# Patient Record
Sex: MALE | Race: WHITE | NOT HISPANIC OR LATINO | Employment: FULL TIME | URBAN - METROPOLITAN AREA
[De-identification: names, ages, dates, MRNs, and addresses within clinical notes are randomized per-mention and may not be internally consistent; named-entity substitution may affect disease eponyms.]

---

## 2017-01-11 ENCOUNTER — OFFICE VISIT (OUTPATIENT)
Dept: FAMILY MEDICINE | Facility: CLINIC | Age: 27
End: 2017-01-11
Payer: COMMERCIAL

## 2017-01-11 VITALS
SYSTOLIC BLOOD PRESSURE: 106 MMHG | WEIGHT: 216 LBS | TEMPERATURE: 98.4 F | DIASTOLIC BLOOD PRESSURE: 60 MMHG | HEIGHT: 71 IN | RESPIRATION RATE: 16 BRPM | HEART RATE: 83 BPM | OXYGEN SATURATION: 99 % | BODY MASS INDEX: 30.24 KG/M2

## 2017-01-11 DIAGNOSIS — J06.9 UPPER RESPIRATORY TRACT INFECTION, UNSPECIFIED TYPE: Primary | ICD-10-CM

## 2017-01-11 PROCEDURE — 99213 OFFICE O/P EST LOW 20 MIN: CPT | Performed by: PHYSICIAN ASSISTANT

## 2017-01-11 NOTE — MR AVS SNAPSHOT
After Visit Summary   1/11/2017    Tremaine Castro    MRN: [id number]           Patient Information     Date Of Birth          1990        Visit Information        Provider Department      1/11/2017 1:20 PM Max Langford PA [facility]        Today's Diagnoses     Upper respiratory tract infection, unspecified type    -  1       Care Instructions    Trial over the counter symptomatic relief, rest, and drink plenty of fluids. Salt water gargles and nasal lavage may also be helpful.  Return to clinic or Emergency Department for breathing/swallowing problems, fever >105, altered mental status, or worsening general condition.      Viral Respiratory Illness:  You have an Upper Respiratory Illness (URI) caused by a virus. This illness is contagious during the first few days. It is spread through the air by coughing and sneezing or by direct contact (touching the sick person and then touching your own eyes, nose or mouth). Most viral illnesses go away within 7-10 days with rest and simple home remedies. Sometimes, the illness may last for several weeks. Antibiotics will not kill a virus and are generally not prescribed for this condition.  Home Care:  1) If symptoms are severe, rest at home for the first 2-3 days. When you resume activity, don't let yourself get too tired.  2) Avoid being exposed to cigarette smoke (yours or others ).  3) Tylenol (acetaminophen) or ibuprofen (Advil, Motrin) will help fever, muscle aching and headache. (Persons under 18 with fever should not take aspirin since this may cause liver damage.)  4) Your appetite may be poor, so a light diet is fine. Avoid dehydration by drinking 6-8 glasses of fluids per day (water, soft, drinks, juices, tea, soup, etc.). Extra fluids will help loosen secretions in the nose and lungs.  5) Over-the-counter cold medicines will not shorten the length of time you re sick, but they may be helpful for the  "following symptoms: cough (Robitussin DM); sore throat (Chloraseptic lozenges or spray); nasal and sinus congestion (Actifed, Sudafed, Chlortrimeton).  Follow Up  with your doctor or as advised if you don t improve over the next week.  Get Prompt Medical Attention  if any of the following occur:  -- Cough with lots of colored sputum (mucus) or blood in your sputum  -- Chest pain, shortness of breath, wheezing or have trouble breathing  -- Severe headache; face, neck or ear pain  -- Fever over 100.4  F (38.0  C) for more than three days  -- You can t swallow due to throat pain    1929-8997 [facility], [address] [place] [address], [place] [address]. All rights reserved. This information is not intended as a substitute for professional medical care. Always follow your healthcare professional's instructions.              Follow-ups after your visit        Follow-up notes from your care team     Return if symptoms worsen or fail to improve.      Who to contact     If you have questions or need follow up information about today's clinic visit or your schedule please contact [facility] directly at [phone].  Normal or non-critical lab and imaging results will be communicated to you by MyChart, letter or phone within 4 business days after the clinic has received the results. If you do not hear from us within 7 days, please contact the clinic through [employer] or phone. If you have a critical or abnormal lab result, we will notify you by phone as soon as possible.  Submit refill requests through XING or call your pharmacy and they will forward the refill request to us. Please allow 3 business days for your refill to be completed.          Additional Information About Your Visit        [employer] Information     XING lets you send messages to your doctor, view your test results, renew your prescriptions, schedule appointments and more. To sign up, go to [email or web address] . Click on \"Log in\" on " "the left side of the screen, which will take you to the Welcome page. Then click on \"Sign up Now\" on the right side of the page.     You will be asked to enter the access code listed below, as well as some personal information. Please follow the directions to create your username and password.     Your access code is: [email or web address]  Expires: 2017  1:52 PM     Your access code will  in 90 days. If you need help or a new code, please call your [facility] or [phone].        Care EveryWhere ID     This is your Care EveryWhere ID. This could be used by other organizations to access your [place] medical records  [id number]        Your Vitals Were     Pulse Temperature Respirations Height BMI (Body Mass Index) Pulse Oximetry    83 98.4  F (36.9  C) (Oral) 16 5' 11\" (1.803 m) 30.14 kg/m2 99%       Blood Pressure from Last 3 Encounters:   17 106/60   16 130/87   16 130/70    Weight from Last 3 Encounters:   17 216 lb (97.977 kg)   16 213 lb (96.616 kg)   16 216 lb 9.6 oz (98.249 kg)              Today, you had the following     No orders found for display       Primary Care Provider Office Phone # Fax #    Brian Echevarria [phone]       [facility] [address] [facility] [address]        Thank you!     Thank you for choosing [facility]  for your care. Our goal is always to provide you with excellent care. Hearing back from our patients is one way we can continue to improve our services. Please take a few minutes to complete the written survey that you may receive in the mail after your visit with us. Thank you!             Your Updated Medication List - Protect others around you: Learn how to safely use, store and throw away your medicines at www.disposemymeds.org.      Notice  As of 2017  1:52 PM    You have not been prescribed any medications.      "

## 2017-01-11 NOTE — PROGRESS NOTES
"  SUBJECTIVE:                                                    Tremaine Castro is a 26 year old male who presents to clinic today for the following health issues:      Acute Illness   Acute illness concerns: sinus  Onset: 4-5 days ago with ST, today with other sympotms     Fever: no    Chills/Sweats: no    Headache (location?): YES    Sinus Pressure:YES    Conjunctivitis:  no    Ear Pain: YES: right    Rhinorrhea: YES    Congestion: YES    Sore Throat: YES     Cough: YES-non-productive    Wheeze: no    Decreased Appetite: YES    Nausea: no    Vomiting: no    Diarrhea:  no    Dysuria/Freq.: no    Fatigue/Achiness: YES    Sick/Strep Exposure: YES- work      Therapies Tried and outcome: nothing             No Known Allergies    Past Medical History   Diagnosis Date     ADD (attention deficit disorder)      Back pain      L-4 L-5 Prolase per patient          No current outpatient prescriptions on file prior to visit.  No current facility-administered medications on file prior to visit.    Social History   Substance Use Topics     Smoking status: Never Smoker      Smokeless tobacco: Current User     Types: Chew     Alcohol Use: Yes      Comment: social       ROS:  Consitutional: As above  ENT: As above  Respiratory: As above    OBJECTIVE:  /60 mmHg  Pulse 83  Temp(Src) 98.4  F (36.9  C) (Oral)  Resp 16  Ht 5' 11\" (1.803 m)  Wt 216 lb (97.977 kg)  BMI 30.14 kg/m2  SpO2 99%  GENERAL APPEARANCE: healthy, alert and no distress  EYES: conjunctiva clear  HENT:  TMs w/o erythema, effusion or bulging.  Nose and mouth without ulcers, erythema or lesions.  NO tonsillar enlargement erythema or exudates.   NECK: supple, nontender, no lymphadenopathy  RESP: lungs clear to auscultation - no rales, rhonchi or wheezes  CV: regular rates and rhythm, normal S1 S2, no murmur noted  NEURO: awake, alert          ASSESSMENT: Well appearing.    ICD-10-CM    1. Upper respiratory tract infection, unspecified type J06.9  "         PLAN:  Lots of rest and fluids.  RTC if any worsening symptoms or if not improving.    Nicolás Langford PA-C

## 2017-01-11 NOTE — PATIENT INSTRUCTIONS
Trial over the counter symptomatic relief, rest, and drink plenty of fluids. Salt water gargles and nasal lavage may also be helpful.  Return to clinic or Emergency Department for breathing/swallowing problems, fever >105, altered mental status, or worsening general condition.      Viral Respiratory Illness:  You have an Upper Respiratory Illness (URI) caused by a virus. This illness is contagious during the first few days. It is spread through the air by coughing and sneezing or by direct contact (touching the sick person and then touching your own eyes, nose or mouth). Most viral illnesses go away within 7-10 days with rest and simple home remedies. Sometimes, the illness may last for several weeks. Antibiotics will not kill a virus and are generally not prescribed for this condition.  Home Care:  1) If symptoms are severe, rest at home for the first 2-3 days. When you resume activity, don't let yourself get too tired.  2) Avoid being exposed to cigarette smoke (yours or others ).  3) Tylenol (acetaminophen) or ibuprofen (Advil, Motrin) will help fever, muscle aching and headache. (Persons under 18 with fever should not take aspirin since this may cause liver damage.)  4) Your appetite may be poor, so a light diet is fine. Avoid dehydration by drinking 6-8 glasses of fluids per day (water, soft, drinks, juices, tea, soup, etc.). Extra fluids will help loosen secretions in the nose and lungs.  5) Over-the-counter cold medicines will not shorten the length of time you re sick, but they may be helpful for the following symptoms: cough (Robitussin DM); sore throat (Chloraseptic lozenges or spray); nasal and sinus congestion (Actifed, Sudafed, Chlortrimeton).  Follow Up  with your doctor or as advised if you don t improve over the next week.  Get Prompt Medical Attention  if any of the following occur:  -- Cough with lots of colored sputum (mucus) or blood in your sputum  -- Chest pain, shortness of breath, wheezing or  have trouble breathing  -- Severe headache; face, neck or ear pain  -- Fever over 100.4  F (38.0  C) for more than three days  -- You can t swallow due to throat pain    [id number] [employer], [address] [place] [address], [place] [address]. All rights reserved. This information is not intended as a substitute for professional medical care. Always follow your healthcare professional's instructions.

## 2017-01-11 NOTE — Clinical Note
[address] [address]  Phone: [phone]    January 11, 2017        Tremaine Ely Matthew  [facility] [address]          To whom it may concern:    RE: Tremaine Farmeruriel    Patient was seen and treated today at our clinic.  Patient excused from work today.    Patient may return to work without restrictions after that.          Please contact me for questions or concerns.      Sincerely,        RADHA Aguirre

## 2017-01-11 NOTE — NURSING NOTE
"Chief Complaint   Patient presents with     URI       Initial /60 mmHg  Pulse 83  Temp(Src) 98.4  F (36.9  C) (Oral)  Ht 5' 11\" (1.803 m)  Wt 216 lb (97.977 kg)  BMI 30.14 kg/m2  SpO2 99% Estimated body mass index is 30.14 kg/(m^2) as calculated from the following:    Height as of this encounter: 5' 11\" (1.803 m).    Weight as of this encounter: 216 lb (97.977 kg).  BP completed using cuff size: juan francisco Rhodes CMA      "

## 2017-04-13 ENCOUNTER — OFFICE VISIT (OUTPATIENT)
Dept: FAMILY MEDICINE | Facility: CLINIC | Age: 27
End: 2017-04-13
Payer: COMMERCIAL

## 2017-04-13 VITALS
DIASTOLIC BLOOD PRESSURE: 80 MMHG | SYSTOLIC BLOOD PRESSURE: 120 MMHG | WEIGHT: 218 LBS | BODY MASS INDEX: 30.4 KG/M2 | HEART RATE: 94 BPM | TEMPERATURE: 98.5 F

## 2017-04-13 DIAGNOSIS — R59.9 ENLARGED LYMPH NODES: Primary | ICD-10-CM

## 2017-04-13 PROCEDURE — 99212 OFFICE O/P EST SF 10 MIN: CPT | Performed by: FAMILY MEDICINE

## 2017-04-13 NOTE — PROGRESS NOTES
SUBJECTIVE:  26 year old.The patient has a history of mass.  This started 5 dyas ago. Location left lumbar are quality smooth Associated symptoms are painful when palated .  Brought on by unknown . ROS no fever, chills, weight stable  Father colon cancer and patient has had colonscopy within the last 5 years.    Reviewed health maintenance  Patient Active Problem List   Diagnosis     Family history of colon cancer     CARDIOVASCULAR SCREENING; LDL GOAL LESS THAN 160     Low back pain     Sciatica     Intervertebral disk syndrome     Past Medical History:   Diagnosis Date     ADD (attention deficit disorder)      Back pain     L-4 L-5 Prolase per patient        OBJECTIVE:  no apparent distress  /80  Pulse 94  Temp 98.5  F (36.9  C) (Oral)  Wt 218 lb (98.9 kg)  BMI 30.4 kg/m2  left subcutaneous mass smooth firm 1 cm in diameter       ICD-10-CM    1. Enlarged lymph nodes R59.9     PLAN: Recheck one month

## 2017-04-13 NOTE — MR AVS SNAPSHOT
"              After Visit Summary   2017    Tremaine Castro    MRN: [id number]           Patient Information     Date Of Birth          1990        Visit Information        Provider Department      2017 4:00 PM Brian Echevarria MD [facility]        Today's Diagnoses     Enlarged lymph nodes    -  1       Follow-ups after your visit        Who to contact     If you have questions or need follow up information about today's clinic visit or your schedule please contact [facility] directly at [phone].  Normal or non-critical lab and imaging results will be communicated to you by MyChart, letter or phone within 4 business days after the clinic has received the results. If you do not hear from us within 7 days, please contact the clinic through [employer] or phone. If you have a critical or abnormal lab result, we will notify you by phone as soon as possible.  Submit refill requests through Kitchfix or call your pharmacy and they will forward the refill request to us. Please allow 3 business days for your refill to be completed.          Additional Information About Your Visit        MyChart Information     Kitchfix lets you send messages to your doctor, view your test results, renew your prescriptions, schedule appointments and more. To sign up, go to [email or web address] . Click on \"Log in\" on the left side of the screen, which will take you to the Welcome page. Then click on \"Sign up Now\" on the right side of the page.     You will be asked to enter the access code listed below, as well as some personal information. Please follow the directions to create your username and password.     Your access code is: [email or web address]  Expires: 2017  4:18 PM     Your access code will  in 90 days. If you need help or a new code, please call your [facility] or [phone].        Care EveryWhere ID     This is your Care EveryWhere ID. This could be used by other " organizations to access your [facility] medical records  [id number]        Your Vitals Were     Pulse Temperature BMI (Body Mass Index)             94 98.5  F (36.9  C) (Oral) 30.4 kg/m2          Blood Pressure from Last 3 Encounters:   04/13/17 120/80   01/11/17 106/60   06/19/16 130/87    Weight from Last 3 Encounters:   04/13/17 218 lb (98.9 kg)   01/11/17 216 lb (98 kg)   06/19/16 213 lb (96.6 kg)              Today, you had the following     No orders found for display       Primary Care Provider Office Phone # Fax #    Brian Echevarria [phone]       [facility] [address] [facility] [address]        Thank you!     Thank you for choosing [facility]  for your care. Our goal is always to provide you with excellent care. Hearing back from our patients is one way we can continue to improve our services. Please take a few minutes to complete the written survey that you may receive in the mail after your visit with us. Thank you!             Your Updated Medication List - Protect others around you: Learn how to safely use, store and throw away your medicines at www.disposemymeds.org.      Notice  As of 4/13/2017  4:18 PM    You have not been prescribed any medications.

## 2017-04-13 NOTE — NURSING NOTE
"Chief Complaint   Patient presents with     Mass     lump in lower back, history of L-4, L-5 disc protrusion        Initial /85  Pulse 94  Temp 98.5  F (36.9  C) (Oral)  Wt 218 lb (98.9 kg)  BMI 30.4 kg/m2 Estimated body mass index is 30.4 kg/(m^2) as calculated from the following:    Height as of 1/11/17: 5' 11\" (1.803 m).    Weight as of this encounter: 218 lb (98.9 kg).  Medication Reconciliation: complete  Tyron Isaacs CMA    "

## 2017-04-21 ENCOUNTER — TELEPHONE (OUTPATIENT)
Dept: FAMILY MEDICINE | Facility: CLINIC | Age: 27
End: 2017-04-21

## 2017-04-21 DIAGNOSIS — M79.9 SOFT TISSUE LESION: Primary | ICD-10-CM

## 2017-04-21 NOTE — TELEPHONE ENCOUNTER
Recently seen for small lump in back.  Tremaine wants name and number to specialist that was recommended (he cant remember what type of specialist it is?)

## 2017-04-21 NOTE — TELEPHONE ENCOUNTER
RN doesn't see in office notes that patient was referred anywhere.   Routing to PCP to advise on referral.      Paula Nelson, RN

## 2017-05-02 ENCOUNTER — OFFICE VISIT (OUTPATIENT)
Dept: SURGERY | Facility: CLINIC | Age: 27
End: 2017-05-02
Payer: COMMERCIAL

## 2017-05-02 VITALS
HEIGHT: 71 IN | HEART RATE: 74 BPM | BODY MASS INDEX: 30.25 KG/M2 | WEIGHT: 216.1 LBS | SYSTOLIC BLOOD PRESSURE: 139 MMHG | OXYGEN SATURATION: 98 % | DIASTOLIC BLOOD PRESSURE: 86 MMHG

## 2017-05-02 DIAGNOSIS — D23.5 BENIGN NEOPLASM OF SKIN OF TRUNK, EXCEPT SCROTUM: Primary | ICD-10-CM

## 2017-05-02 PROCEDURE — 99203 OFFICE O/P NEW LOW 30 MIN: CPT | Performed by: SURGERY

## 2017-05-02 ASSESSMENT — PAIN SCALES - GENERAL: PAINLEVEL: NO PAIN (1)

## 2017-05-02 NOTE — NURSING NOTE
"Chief Complaint   Patient presents with     Consult     Soft tissue lesion [M79.9] Pt reports on the lower off to the left side.        Initial /86 (BP Location: Right arm, Patient Position: Chair, Cuff Size: Adult Regular)  Pulse 74  Ht 5' 11\" (1.803 m)  Wt 216 lb 1.6 oz (98 kg)  SpO2 98%  BMI 30.14 kg/m2 Estimated body mass index is 30.14 kg/(m^2) as calculated from the following:    Height as of this encounter: 5' 11\" (1.803 m).    Weight as of this encounter: 216 lb 1.6 oz (98 kg)..  BP completed using cuff size: regular    Wilda Robison CMA    "

## 2017-05-02 NOTE — MR AVS SNAPSHOT
After Visit Summary   5/2/2017    Tremaine Castro    MRN: [id number]           Patient Information     Date Of Birth          1990        Visit Information        Provider Department      5/2/2017 10:00 AM Andrés Mccoy,  [facility]        Today's Diagnoses     Benign neoplasm of skin of trunk, except scrotum    -  1      Care Instructions    Scheduling Information:  To schedule your CT/MRI scan, please contact [facility] at [phone] or [facility] at [phone].    To schedule your surgery, please contact our Specialty Schedulers at [phone].    ** If a CT scan or biopsy were ordered/done, the Surgeon may need to see you back in clinic to go over the results and discuss treatment optins based on your results.       General Surgery - [place] Location.    If you have any questions regarding to your visit please contact your care team:     [place]annamarie Chavira [address] [place], [place] [address], [place] [address]   [address] Dr..  Lissy Chavira.  Lissy Chavira.   Nettie Blackwood, [facility]  Logan Rahman [facility]  Kristi Robison [facility]   Appointment line: [phone]  [facility] Desk: [phone]  Specialty Scheduling (for surgeries only) [phone]     If you need a medication refill please contact your pharmacy.  Please allow 3 business days for your refill to be completed.    As always, Thank you for trusting us with your health care needs!  _____________________________________________________________________              Follow-ups after your visit        Who to contact     If you have questions or need follow up information about today's clinic visit or your schedule please [facility] at [phone].  Normal or non-critical lab and imaging results will be communicated to you by MyChart, letter or phone within 4 business days after the clinic has received the results. If  "you do not hear from us within 7 days, please contact the clinic through [employer] or phone. If you have a critical or abnormal lab result, we will notify you by phone as soon as possible.  Submit refill requests through [employer] or call your pharmacy and they will forward the refill request to us. Please allow 3 business days for your refill to be completed.          Additional Information About Your Visit        [employer] Information     [employer]ed lets you send messages to your doctor, view your test results, renew your prescriptions, schedule appointments and more. To sign up, go to [email or web address] . Click on \"Log in\" on the left side of the screen, which will take you to the Welcome page. Then click on \"Sign up Now\" on the right side of the page.     You will be asked to enter the access code listed below, as well as some personal information. Please follow the directions to create your username and password.     Your access code is: [email or web address]  Expires: 2017  4:18 PM     Your access code will  in 90 days. If you need help or a new code, please call your [facility] clinic or [phone].        Care EveryWhere ID     This is your Care EveryWhere ID. This could be used by other organizations to access your [place] medical records  [id number]        Your Vitals Were     Pulse Height Pulse Oximetry BMI (Body Mass Index)          74 1.803 m (5' 11\") 98% 30.14 kg/m2         Blood Pressure from Last 3 Encounters:   17 139/86   17 120/80   17 106/60    Weight from Last 3 Encounters:   17 98 kg (216 lb 1.6 oz)   17 98.9 kg (218 lb)   17 98 kg (216 lb)               Primary Care Provider Office Phone # Fax #    Brian Echevarria [phone]       [facility] [address] [facility] [address]        Thank you!     Thank you for choosing [facility]  for your care. Our goal is always to provide you with excellent care. " Hearing back from our patients is one way we can continue to improve our services. Please take a few minutes to complete the written survey that you may receive in the mail after your visit with us. Thank you!             Your Updated Medication List - Protect others around you: Learn how to safely use, store and throw away your medicines at www.disposemymeds.org.      Notice  As of 5/2/2017 11:59 PM    You have not been prescribed any medications.

## 2017-05-02 NOTE — PATIENT INSTRUCTIONS
Scheduling Information:  To schedule your CT/MRI scan, please contact [facility] at [phone] or [facility] at [phone].    To schedule your surgery, please contact our Specialty Schedulers at [phone].    ** If a CT scan or biopsy were ordered/done, the Surgeon may need to see you back in clinic to go over the results and discuss treatment optins based on your results.       General Surgery - [place] Location.    If you have any questions regarding to your visit please contact your care team:     Edwige Chavira [address] [place] [address]  [place] [address]   [address] Dr.over.  Lissy Chavira.  Lissy Chavira.   Nettie Blackwood, [facility]  Logan Rahman [facility]  Kristi Robison [facility]   Appointment line: [phone]  [facility] Desk: [phone]  Specialty Scheduling (for surgeries only) [phone]     If you need a medication refill please contact your pharmacy.  Please allow 3 business days for your refill to be completed.    As always, Thank you for trusting us with your health care needs!  _____________________________________________________________________

## 2017-05-03 ENCOUNTER — RADIANT APPOINTMENT (OUTPATIENT)
Dept: ULTRASOUND IMAGING | Facility: CLINIC | Age: 27
End: 2017-05-03
Attending: SURGERY
Payer: COMMERCIAL

## 2017-05-03 DIAGNOSIS — D23.5 BENIGN NEOPLASM OF SKIN OF TRUNK, EXCEPT SCROTUM: ICD-10-CM

## 2017-05-03 PROCEDURE — 76857 US EXAM PELVIC LIMITED: CPT

## 2017-05-08 NOTE — PROGRESS NOTES
I was asked to see Tremaine Steinoliverr regarding a back mass by Brian Echevarria MD    CC: Mass    HPI:  Patient is a 26 year old male with complaints of dsicomfort associated with his left lower back mass. The patient denies any pain or discomfort but noticed the mass several weeks ago. The mass has not grown recently.     Patient does nothave a personal history of skin problems  Patient does not have a family history of skin cancer but does have a history of colorectal cancer . Hr is nervous. PT had colonoscopy        Review Of Systems    General: negative for fever or chills  Skin: negative except mass noted above  Ears/Nose/Throat: negative for, epistaxis, bleeding gums  Respiratory: No shortness of breath, dyspnea on exertion, cough, or hemoptysis  Cardiovascular: negative for and chest pain  Neurologic: negative for local weakness  Hematologic/Lymphatic/Immunologic: negative for, bleeding disorder and frequent infections  Endocrine: negative for, diabetes, polydipsia and polyuria    Past Medical History:   Diagnosis Date     ADD (attention deficit disorder)      Back pain     L-4 L-5 Prolase per patient        Past Surgical History:   Procedure Laterality Date     COLONOSCOPY  1/7/2014    Procedure: COLONOSCOPY;  COLONOSCOPY SCREEN/ FAM REJI/ AFTAB;  Surgeon: Joby Landa MD;  Location: [facility]     NO HISTORY OF SURGERY         Social History     Social History     Marital status: Single     Spouse name: N/A     Number of children: N/A     Years of education: N/A     Occupational History     Not on file.     Social History Main Topics     Smoking status: Never Smoker     Smokeless tobacco: Current User     Types: Chew     Alcohol use Yes      Comment: social     Drug use: No     Sexual activity: Not Currently     Other Topics Concern     Not on file     Social History Narrative       No current outpatient prescriptions on file.       Physical exam:   /86 (BP Location: Right arm, Patient  "Position: Chair, Cuff Size: Adult Regular)  Pulse 74  Ht 1.803 m (5' 11\")  Wt 98 kg (216 lb 1.6 oz)  SpO2 98%  BMI 30.14 kg/m2    Exam:  Constitutional: healthy, alert and no distress  Head: Normocephalic. No masses, lesions, tenderness or abnormalities  ENT: Mucous membranes moist  Respiratory:  Non-labored respiration  Musculoskeletal: No focal abnormality, smooth round mass noted on right side  Neurologic: No gross deficit  Psychiatric: mentation appears normal and affect normal/bright  Hematologic/Lymphatic/Immunologic: No bruising noted      Previous MRI in 2013 did not show mass      Assessment: Back mass  Plan     US  Possible CT scan  F/U visit to discuss surgery    Andrés Mccoy    "

## 2017-05-09 ENCOUNTER — TELEPHONE (OUTPATIENT)
Dept: SURGERY | Facility: CLINIC | Age: 27
End: 2017-05-09

## 2017-05-09 NOTE — TELEPHONE ENCOUNTER
Reason for Call:  Test results    Detailed comments:  Patient would like the results of the ultrasound test done on 5-3-17. Please call.    Phone Number Patient can be reached at: Home number on file [phone] (home)    Best Time: any    Can we leave a detailed message on this number? yes    Call taken on 5/9/2017 at 12:06 PM by Goldie Sandhu

## 2017-05-09 NOTE — TELEPHONE ENCOUNTER
Dr. Mccoy,    Please advise and I can call Patient to discuss.  Thank you,    Wilda Robison CMA.

## 2017-05-30 ENCOUNTER — TELEPHONE (OUTPATIENT)
Dept: SURGERY | Facility: CLINIC | Age: 27
End: 2017-05-30

## 2017-06-07 ENCOUNTER — RADIANT APPOINTMENT (OUTPATIENT)
Dept: CT IMAGING | Facility: CLINIC | Age: 27
End: 2017-06-07
Attending: SURGERY
Payer: COMMERCIAL

## 2017-06-07 DIAGNOSIS — D23.5 BENIGN NEOPLASM OF SKIN OF TRUNK, EXCEPT SCROTUM: ICD-10-CM

## 2017-06-07 PROCEDURE — 74177 CT ABD & PELVIS W/CONTRAST: CPT | Mod: TC

## 2017-06-07 RX ORDER — IOPAMIDOL 755 MG/ML
99 INJECTION, SOLUTION INTRAVASCULAR ONCE
Status: COMPLETED | OUTPATIENT
Start: 2017-06-07 | End: 2017-06-07

## 2017-06-07 RX ADMIN — IOPAMIDOL 99 ML: 755 INJECTION, SOLUTION INTRAVASCULAR at 10:08

## 2017-06-21 ENCOUNTER — TELEPHONE (OUTPATIENT)
Dept: SURGERY | Facility: CLINIC | Age: 27
End: 2017-06-21

## 2017-06-21 NOTE — TELEPHONE ENCOUNTER
Dr. Mccoy,    Please call the Patient to go over his CT results.   Or please discuss them with me, and I can call Patient to discuss them.    Thank you,  Wilda Robison CMA.

## 2017-06-27 NOTE — TELEPHONE ENCOUNTER
Pt called with the results. All questions answered to the apparent satisfaction of the patient. Patient will think about surgery since it is likely a lipoma and is not particularly noticeable

## 2018-02-21 ENCOUNTER — OFFICE VISIT (OUTPATIENT)
Dept: FAMILY MEDICINE | Facility: CLINIC | Age: 28
End: 2018-02-21
Payer: COMMERCIAL

## 2018-02-21 VITALS
RESPIRATION RATE: 12 BRPM | SYSTOLIC BLOOD PRESSURE: 130 MMHG | BODY MASS INDEX: 31.25 KG/M2 | TEMPERATURE: 96.6 F | HEIGHT: 71 IN | OXYGEN SATURATION: 97 % | HEART RATE: 66 BPM | DIASTOLIC BLOOD PRESSURE: 80 MMHG | WEIGHT: 223.2 LBS

## 2018-02-21 DIAGNOSIS — Z20.821 ZIKA VIRUS EXPOSURE: Primary | ICD-10-CM

## 2018-02-21 PROCEDURE — 99213 OFFICE O/P EST LOW 20 MIN: CPT | Performed by: FAMILY MEDICINE

## 2018-02-21 ASSESSMENT — PAIN SCALES - GENERAL: PAINLEVEL: NO PAIN (0)

## 2018-02-21 NOTE — NURSING NOTE
"Chief Complaint   Patient presents with     Family planning       Initial BP (!) 143/94  Pulse 66  Temp 96.6  F (35.9  C) (Oral)  Resp 12  Ht 5' 11\" (1.803 m)  Wt 223 lb 3.2 oz (101.2 kg)  SpO2 97%  BMI 31.13 kg/m2 Estimated body mass index is 31.13 kg/(m^2) as calculated from the following:    Height as of this encounter: 5' 11\" (1.803 m).    Weight as of this encounter: 223 lb 3.2 oz (101.2 kg).  Medication Reconciliation: complete  Tyron Isaacs CMA    "

## 2018-02-21 NOTE — PROGRESS NOTES
"SUBJECTIVE:  27 year old.The patient has a history of travel to [facility] in [place] and is considering trying to get wife pregnant. He had a number of bug bites.   OBJECTIVE:  no apparent distress  /80  Pulse 66  Temp 96.6  F (35.9  C) (Oral)  Resp 12  Ht 5' 11\" (1.803 m)  Wt 223 lb 3.2 oz (101.2 kg)  SpO2 97%  BMI 31.13 kg/m2       ICD-10-CM    1. Zika virus exposure Z20.828     PLAN:do not try to get pregnant for 6 months after exposure.  Over  half of theTotal time 15 minutes spent in cordination care. Discussed diagnoses, prognoses and treatment. Reviewed the CDC website  "

## 2018-02-21 NOTE — MR AVS SNAPSHOT
"              After Visit Summary   2018    Tremaine Castro    MRN: [id number]           Patient Information     Date Of Birth          1990        Visit Information        Provider Department      2018 3:30 PM Brian Echevarria MD [facility]        Today's Diagnoses     Zika virus exposure    -  1       Follow-ups after your visit        Who to contact     If you have questions or need follow up information about today's clinic visit or your schedule please contact [facility] directly at [phone].  Normal or non-critical lab and imaging results will be communicated to you by MyChart, letter or phone within 4 business days after the clinic has received the results. If you do not hear from us within 7 days, please contact the clinic through [employer] or phone. If you have a critical or abnormal lab result, we will notify you by phone as soon as possible.  Submit refill requests through [employer] or call your pharmacy and they will forward the refill request to us. Please allow 3 business days for your refill to be completed.          Additional Information About Your Visit        MyChart Information     [employer] lets you send messages to your doctor, view your test results, renew your prescriptions, schedule appointments and more. To sign up, go to [email or web address] . Click on \"Log in\" on the left side of the screen, which will take you to the Welcome page. Then click on \"Sign up Now\" on the right side of the page.     You will be asked to enter the access code listed below, as well as some personal information. Please follow the directions to create your username and password.     Your access code is: [email or web address]  Expires: 2018  4:08 PM     Your access code will  in 90 days. If you need help or a new code, please call your [facility] or [phone].        Care EveryWhere ID     This is your Care EveryWhere ID. This could be used by other " "organizations to access your [facility] medical records  [id number]        Your Vitals Were     Pulse Temperature Respirations Height Pulse Oximetry BMI (Body Mass Index)    66 96.6  F (35.9  C) (Oral) 12 5' 11\" (1.803 m) 97% 31.13 kg/m2       Blood Pressure from Last 3 Encounters:   02/21/18 130/80   05/02/17 139/86   04/13/17 120/80    Weight from Last 3 Encounters:   02/21/18 223 lb 3.2 oz (101.2 kg)   05/02/17 216 lb 1.6 oz (98 kg)   04/13/17 218 lb (98.9 kg)              Today, you had the following     No orders found for display       Primary Care Provider Office Phone # Fax #    Brian Echevarria [phone] [address] [facility] [address]        Equal Access to Services     [facility]: Hadii aad ku hadasho Soomaali, waaxda luqadaha, qaybta kaalmada adeegyada, waxay trudiin hayaan candida ramosaramaico aleman . So [facility] [phone].    ATENCIÓN: Si habla español, tiene a spence disposición servicios gratuitos de asistencia lingüística. Llame al [phone].    We comply with applicable federal civil rights laws and [place] laws. We do not discriminate on the basis of race, color, national origin, age, disability, sex, sexual orientation, or gender identity.            Thank you!     Thank you for choosing [facility]  for your care. Our goal is always to provide you with excellent care. Hearing back from our patients is one way we can continue to improve our services. Please take a few minutes to complete the written survey that you may receive in the mail after your visit with us. Thank you!             Your Updated Medication List - Protect others around you: Learn how to safely use, store and throw away your medicines at www.disposemymeds.org.      Notice  As of 2/21/2018  4:08 PM    You have not been prescribed any medications.      "

## 2018-10-23 ENCOUNTER — TELEPHONE (OUTPATIENT)
Dept: FAMILY MEDICINE | Facility: CLINIC | Age: 28
End: 2018-10-23

## 2018-10-23 DIAGNOSIS — Z80.0 FAMILY HISTORY OF COLON CANCER: Primary | ICD-10-CM

## 2018-10-23 NOTE — TELEPHONE ENCOUNTER
Patient would like a referral for colonoscopy to be done at [facility]    Please call when this has been done so patient can schedule this appointment    Thank you

## 2018-10-23 NOTE — TELEPHONE ENCOUNTER
Called pt and notified him that colonoscopy order has been signed. He will call and schedule appointment at [facility]. ÁNGELA Brown

## 2018-10-23 NOTE — TELEPHONE ENCOUNTER
Dr. Brian Echevarria:  Requesting colonoscopy order.  Patient has paternal family history of early onset colon cancer.  Per oncology note to have colonoscopy done every 5 years   Last done 1/7/14.  Due this January.  He is requesting order.  I have pended order for MD signature.  Hyacinth Esposito RN  SEND BACK TO TEAM FOR  TO NOTIFY PATIENT WHEN DONE PLEASE.

## 2018-12-28 ENCOUNTER — HOSPITAL ENCOUNTER (OUTPATIENT)
Facility: AMBULATORY SURGERY CENTER | Age: 28
Discharge: HOME | End: 2018-12-28
Attending: SURGERY | Admitting: SURGERY
Payer: COMMERCIAL

## 2018-12-28 VITALS
RESPIRATION RATE: 16 BRPM | OXYGEN SATURATION: 97 % | DIASTOLIC BLOOD PRESSURE: 84 MMHG | SYSTOLIC BLOOD PRESSURE: 133 MMHG | TEMPERATURE: 97.3 F

## 2018-12-28 LAB — COLONOSCOPY: NORMAL

## 2018-12-28 PROCEDURE — G8907 PT DOC NO EVENTS ON DISCHARG: HCPCS

## 2018-12-28 PROCEDURE — 99152 MOD SED SAME PHYS/QHP 5/>YRS: CPT | Mod: 59 | Performed by: SURGERY

## 2018-12-28 PROCEDURE — G8918 PT W/O PREOP ORDER IV AB PRO: HCPCS

## 2018-12-28 PROCEDURE — 45385 COLONOSCOPY W/LESION REMOVAL: CPT | Mod: PT | Performed by: SURGERY

## 2018-12-28 PROCEDURE — 88305 TISSUE EXAM BY PATHOLOGIST: CPT | Performed by: SURGERY

## 2018-12-28 PROCEDURE — 45378 DIAGNOSTIC COLONOSCOPY: CPT

## 2018-12-28 RX ORDER — LIDOCAINE 40 MG/G
CREAM TOPICAL
Status: DISCONTINUED | OUTPATIENT
Start: 2018-12-28 | End: 2018-12-29 | Stop reason: HOSPADM

## 2018-12-28 RX ORDER — ONDANSETRON 2 MG/ML
4 INJECTION INTRAMUSCULAR; INTRAVENOUS
Status: DISCONTINUED | OUTPATIENT
Start: 2018-12-28 | End: 2018-12-29 | Stop reason: HOSPADM

## 2018-12-28 RX ORDER — FENTANYL CITRATE 50 UG/ML
INJECTION, SOLUTION INTRAMUSCULAR; INTRAVENOUS PRN
Status: DISCONTINUED | OUTPATIENT
Start: 2018-12-28 | End: 2018-12-28 | Stop reason: HOSPADM

## 2022-02-17 ENCOUNTER — OFFICE VISIT (OUTPATIENT)
Dept: FAMILY MEDICINE | Facility: CLINIC | Age: 32
End: 2022-02-17
Payer: COMMERCIAL

## 2022-02-17 VITALS
WEIGHT: 238 LBS | BODY MASS INDEX: 33.32 KG/M2 | DIASTOLIC BLOOD PRESSURE: 80 MMHG | HEIGHT: 71 IN | TEMPERATURE: 97.5 F | HEART RATE: 82 BPM | RESPIRATION RATE: 16 BRPM | OXYGEN SATURATION: 98 % | SYSTOLIC BLOOD PRESSURE: 138 MMHG

## 2022-02-17 DIAGNOSIS — F41.9 ANXIETY: ICD-10-CM

## 2022-02-17 DIAGNOSIS — Z23 NEED FOR VACCINATION: ICD-10-CM

## 2022-02-17 DIAGNOSIS — F10.11 HISTORY OF ETOH ABUSE: ICD-10-CM

## 2022-02-17 DIAGNOSIS — Z00.00 ROUTINE GENERAL MEDICAL EXAMINATION AT A HEALTH CARE FACILITY: Primary | ICD-10-CM

## 2022-02-17 PROCEDURE — 90472 IMMUNIZATION ADMIN EACH ADD: CPT | Performed by: PHYSICIAN ASSISTANT

## 2022-02-17 PROCEDURE — 99385 PREV VISIT NEW AGE 18-39: CPT | Mod: 25 | Performed by: PHYSICIAN ASSISTANT

## 2022-02-17 PROCEDURE — 99213 OFFICE O/P EST LOW 20 MIN: CPT | Mod: 25 | Performed by: PHYSICIAN ASSISTANT

## 2022-02-17 PROCEDURE — 90715 TDAP VACCINE 7 YRS/> IM: CPT | Performed by: PHYSICIAN ASSISTANT

## 2022-02-17 PROCEDURE — 90686 IIV4 VACC NO PRSV 0.5 ML IM: CPT | Performed by: PHYSICIAN ASSISTANT

## 2022-02-17 PROCEDURE — 90471 IMMUNIZATION ADMIN: CPT | Performed by: PHYSICIAN ASSISTANT

## 2022-02-17 ASSESSMENT — ENCOUNTER SYMPTOMS
DYSURIA: 0
PALPITATIONS: 0
HEADACHES: 1
HEARTBURN: 0
DIZZINESS: 0
FREQUENCY: 0
WEAKNESS: 0
ARTHRALGIAS: 0
MYALGIAS: 0
PARESTHESIAS: 0
NAUSEA: 0
FEVER: 0
HEMATURIA: 0
HEMATOCHEZIA: 0
SHORTNESS OF BREATH: 0
NERVOUS/ANXIOUS: 1
CONSTIPATION: 0
SORE THROAT: 0
CHILLS: 0
JOINT SWELLING: 0
ABDOMINAL PAIN: 0
EYE PAIN: 0
DIARRHEA: 0
COUGH: 0

## 2022-02-17 ASSESSMENT — PATIENT HEALTH QUESTIONNAIRE - PHQ9
10. IF YOU CHECKED OFF ANY PROBLEMS, HOW DIFFICULT HAVE THESE PROBLEMS MADE IT FOR YOU TO DO YOUR WORK, TAKE CARE OF THINGS AT HOME, OR GET ALONG WITH OTHER PEOPLE: VERY DIFFICULT
SUM OF ALL RESPONSES TO PHQ QUESTIONS 1-9: 13
SUM OF ALL RESPONSES TO PHQ QUESTIONS 1-9: 13

## 2022-02-17 ASSESSMENT — PAIN SCALES - GENERAL: PAINLEVEL: NO PAIN (0)

## 2022-02-17 NOTE — PROGRESS NOTES
SUBJECTIVE:   CC: Tremaine Castro is an 31 year old male who presents for preventative health visit.         Healthy Habits:     Getting at least 3 servings of Calcium per day:  Yes    Bi-annual eye exam:  NO    Dental care twice a year:  NO    Sleep apnea or symptoms of sleep apnea:  None    Diet:  Regular (no restrictions)    Frequency of exercise:  None    Taking medications regularly:  Yes    Medication side effects:  None    PHQ-2 Total Score: 4    Additional concerns today:  No      Increased anxiety - off and on since a child.   Life stress. Was drinking 5-6 hard liquor a night   And now 3 wks ago started drinking 5 beers 2 nights a week.   Is seeing a counseling and going well.       Today's PHQ-2 Score:   PHQ-2 ( 1999 Pf[employer]) 2/17/2022   Q1: Little interest or pleasure in doing things 2   Q2: Feeling down, depressed or hopeless 2   PHQ-2 Score 4   Q1: Little interest or pleasure in doing things More than half the days   Q2: Feeling down, depressed or hopeless More than half the days   PHQ-2 Score 4       Abuse: Current or Past(Physical, Sexual or Emotional)- No  Do you feel safe in your environment? Yes    Have you ever done Advance Care Planning? (For example, a Health Directive, POLST, or a discussion with a medical provider or your loved ones about your wishes): No, advance care planning information given to patient to review.  Patient declined advance care planning discussion at this time.    Social History     Tobacco Use     Smoking status: Never Smoker     Smokeless tobacco: Current User     Types: Chew   Substance Use Topics     Alcohol use: Yes     Comment: social         Alcohol Use 2/17/2022   Prescreen: >3 drinks/day or >7 drinks/week? Yes   Prescreen: >3 drinks/day or >7 drinks/week? -   AUDIT SCORE  17       Last PSA: No results found for: PSA    Reviewed orders with patient. Reviewed health maintenance and updated orders accordingly - Yes  Lab work is in process  Labs reviewed in  EPIC  BP Readings from Last 3 Encounters:   02/17/22 138/80   12/28/18 133/84   02/21/18 130/80    Wt Readings from Last 3 Encounters:   02/17/22 108 kg (238 lb)   02/21/18 101.2 kg (223 lb 3.2 oz)   05/02/17 98 kg (216 lb 1.6 oz)                  Patient Active Problem List   Diagnosis     Family history of colon cancer     CARDIOVASCULAR SCREENING; LDL GOAL LESS THAN 160     Low back pain     Sciatica     Intervertebral disk syndrome     Anxiety     History of ETOH abuse     Past Surgical History:   Procedure Laterality Date     COLONOSCOPY  1/7/2014    Procedure: COLONOSCOPY;  COLONOSCOPY SCREEN/ FAM HX/ AFTAB;  Surgeon: Joby Landa MD;  Location: [facility] OR     COLONOSCOPY N/A 12/28/2018    Procedure: Combined Colonoscopy, Single Or Multiple Biopsy/Polypectomy By Biopsy;  Surgeon: Andrés Mccoy DO;  Location: [facility] OR     COLONOSCOPY WITH CO2 INSUFFLATION N/A 12/28/2018    Procedure: COLONOSCOPY WITH CO2 INSUFFLATION;  Surgeon: Andrés Mccoy DO;  Location: [facility] OR     NO HISTORY OF SURGERY         Social History     Tobacco Use     Smoking status: Never Smoker     Smokeless tobacco: Current User     Types: Chew   Substance Use Topics     Alcohol use: Yes     Comment: social     Family History   Problem Relation Age of Onset     Hypertension Mother      Heart Disease Father      Hypertension Father      Cancer Father 32        colon      Hypertension Maternal Grandmother          Current Outpatient Medications   Medication Sig Dispense Refill     sertraline (ZOLOFT) 50 MG tablet Take 1 tablet (50 mg) by mouth daily 30 tablet 0     No Known Allergies    Reviewed and updated as needed this visit by clinical staff   Tobacco  Allergies  Meds   Med Hx  Surg Hx  Fam Hx  Soc Hx        Reviewed and updated as needed this visit by Provider                   Past Medical History:   Diagnosis Date     ADD (attention deficit disorder)      Back pain     L-4 L-5 Prolase per patient       Past Surgical History:  "  Procedure Laterality Date     COLONOSCOPY  1/7/2014    Procedure: COLONOSCOPY;  COLONOSCOPY SCREEN/ FAM HX/ AFTAB;  Surgeon: Joby Landa MD;  Location: [facility] OR     COLONOSCOPY N/A 12/28/2018    Procedure: Combined Colonoscopy, Single Or Multiple Biopsy/Polypectomy By Biopsy;  Surgeon: Andrés Mccoy DO;  Location: [facility]     COLONOSCOPY WITH CO2 INSUFFLATION N/A 12/28/2018    Procedure: COLONOSCOPY WITH CO2 INSUFFLATION;  Surgeon: Andrés Mccoy DO;  Location: [facility] OR     NO HISTORY OF SURGERY         Review of Systems   Constitutional: Negative for chills and fever.   HENT: Negative for congestion, ear pain, hearing loss and sore throat.    Eyes: Negative for pain and visual disturbance.   Respiratory: Negative for cough and shortness of breath.    Cardiovascular: Negative for chest pain, palpitations and peripheral edema.   Gastrointestinal: Negative for abdominal pain, constipation, diarrhea, heartburn, hematochezia and nausea.   Genitourinary: Negative for dysuria, frequency, genital sores, hematuria and urgency.   Musculoskeletal: Negative for arthralgias, joint swelling and myalgias.   Skin: Negative for rash.   Neurological: Positive for headaches. Negative for dizziness, weakness and paresthesias.   Psychiatric/Behavioral: Positive for mood changes. The patient is nervous/anxious.      OBJECTIVE:   /80   Pulse 82   Temp 97.5  F (36.4  C) (Tympanic)   Resp 16   Ht 1.803 m (5' 11\")   Wt 108 kg (238 lb)   SpO2 98%   BMI 33.19 kg/m      Physical Exam  GENERAL: healthy, alert and no distress  EYES: Eyes grossly normal to inspection, PERRL and conjunctivae and sclerae normal  HENT: ear canals and TM's normal, nose and mouth without ulcers or lesions  NECK: no adenopathy, no asymmetry, masses, or scars and thyroid normal to palpation  RESP: lungs clear to auscultation - no rales, rhonchi or wheezes  CV: regular rate and rhythm, normal S1 S2, no S3 or S4, no murmur, click or rub, no " "peripheral edema and peripheral pulses strong  ABDOMEN: soft, nontender, no hepatosplenomegaly, no masses and bowel sounds normal   (male): normal male genitalia without lesions or urethral discharge, no hernia  MS: no gross musculoskeletal defects noted, no edema  SKIN: no suspicious lesions or rashes  NEURO: Normal strength and tone, mentation intact and speech normal  PSYCH: mentation appears normal, affect normal/bright    Diagnostic Test Results:  Labs reviewed in Epic    ASSESSMENT/PLAN:       ICD-10-CM    1. Routine general medical examination at a health care facility  Z00.00 Lipid panel reflex to direct LDL Fasting     Comprehensive metabolic panel (BMP + Alb, Alk Phos, ALT, AST, Total. Bili, TP)     TSH with free T4 reflex   2. Anxiety  F41.9 sertraline (ZOLOFT) 50 MG tablet     OFFICE/OUTPT VISIT,EST,LEVL III   3. History of ETOH abuse  F10.11    4. Need for vaccination  Z23 TDAP VACCINE (Adacel, Boostrix)  [[id number]]   1. Work on Healthy diet and exercise. Getting heart rate elevated for 30 mins most days of week. Labs pending.  2.  We talked about continuing counseling and getting started on Zoloft.  Warning signs side effects were discussed.  We will recheck things in about 4 weeks or sooner if needed.    COUNSELING:   Reviewed preventive health counseling, as reflected in patient instructions       Regular exercise       Healthy diet/nutrition       Vision screening    Estimated body mass index is 33.19 kg/m  as calculated from the following:    Height as of this encounter: 1.803 m (5' 11\").    Weight as of this encounter: 108 kg (238 lb).     Weight management plan: Discussed healthy diet and exercise guidelines    He reports that he has never smoked. His smokeless tobacco use includes chew.      Counseling Resources:  ATP IV Guidelines  Pooled Cohorts Equation Calculator  FRAX Risk Assessment  ICSI Preventive Guidelines  Dietary Guidelines for Americans, 2010  USDA's MyPlate  ASA Prophylaxis  Lung " CA Screening    Rai Barragan PA-C  [facility] [place]  Answers for HPI/ROS submitted by the patient on 2/17/2022  If you checked off any problems, how difficult have these problems made it for you to do your work, take care of things at home, or get along with other people?: Very difficult  PHQ9 TOTAL SCORE: 13

## 2022-02-17 NOTE — NURSING NOTE
Prior to immunization administration, verified patients identity using patient s name and date of birth. Please see Immunization Activity for additional information.     Screening Questionnaire for Adult Immunization    Are you sick today?   No   Do you have allergies to medications, food, a vaccine component or latex?   No   Have you ever had a serious reaction after receiving a vaccination?   No   Do you have a long-term health problem with heart, lung, kidney, or metabolic disease (e.g., diabetes), asthma, a blood disorder, no spleen, complement component deficiency, a cochlear implant, or a spinal fluid leak?  Are you on long-term aspirin therapy?   No   Do you have cancer, leukemia, HIV/AIDS, or any other immune system problem?   No   Do you have a parent, brother, or sister with an immune system problem?   No   In the past 3 months, have you taken medications that affect  your immune system, such as prednisone, other steroids, or anticancer drugs; drugs for the treatment of rheumatoid arthritis, Crohn s disease, or psoriasis; or have you had radiation treatments?   No   Have you had a seizure, or a brain or other nervous system problem?   No   During the past year, have you received a transfusion of blood or blood    products, or been given immune (gamma) globulin or antiviral drug?   No   For women: Are you pregnant or is there a chance you could become       pregnant during the next month?   No   Have you received any vaccinations in the past 4 weeks?   No     Immunization questionnaire answers were all negative.        Per orders of [facility], injection of tdap and flu given by Patt Newman CMA. Patient instructed to remain in clinic for 15 minutes afterwards, and to report any adverse reaction to me immediately.       Screening performed by Patt Newman CMA on 2/17/2022 at 3:01 PM.

## 2022-02-18 ASSESSMENT — PATIENT HEALTH QUESTIONNAIRE - PHQ9: SUM OF ALL RESPONSES TO PHQ QUESTIONS 1-9: 13

## 2022-03-15 ENCOUNTER — VIRTUAL VISIT (OUTPATIENT)
Dept: FAMILY MEDICINE | Facility: CLINIC | Age: 32
End: 2022-03-15
Payer: COMMERCIAL

## 2022-03-15 DIAGNOSIS — F41.9 ANXIETY: ICD-10-CM

## 2022-03-15 PROCEDURE — 99213 OFFICE O/P EST LOW 20 MIN: CPT | Mod: 95 | Performed by: PHYSICIAN ASSISTANT

## 2022-03-15 ASSESSMENT — ANXIETY QUESTIONNAIRES
IF YOU CHECKED OFF ANY PROBLEMS ON THIS QUESTIONNAIRE, HOW DIFFICULT HAVE THESE PROBLEMS MADE IT FOR YOU TO DO YOUR WORK, TAKE CARE OF THINGS AT HOME, OR GET ALONG WITH OTHER PEOPLE: SOMEWHAT DIFFICULT
GAD7 TOTAL SCORE: 6
2. NOT BEING ABLE TO STOP OR CONTROL WORRYING: SEVERAL DAYS
7. FEELING AFRAID AS IF SOMETHING AWFUL MIGHT HAPPEN: NOT AT ALL
6. BECOMING EASILY ANNOYED OR IRRITABLE: SEVERAL DAYS
3. WORRYING TOO MUCH ABOUT DIFFERENT THINGS: SEVERAL DAYS
1. FEELING NERVOUS, ANXIOUS, OR ON EDGE: SEVERAL DAYS
5. BEING SO RESTLESS THAT IT IS HARD TO SIT STILL: SEVERAL DAYS

## 2022-03-15 ASSESSMENT — PATIENT HEALTH QUESTIONNAIRE - PHQ9
5. POOR APPETITE OR OVEREATING: SEVERAL DAYS
SUM OF ALL RESPONSES TO PHQ QUESTIONS 1-9: 4

## 2022-03-15 NOTE — PROGRESS NOTES
"Tremaine is a 31 year old who is being evaluated via a billable telephone visit.      What phone number would you like to be contacted at? ***  How would you like to obtain your AVS? {AVS Preference:626602}    {PROVIDER CHARTING PREFERENCE:604308}    Subjective   Tremaine is a 31 year old who presents for the following health issues {ACCOMPANIED BY STATEMENT (Optional):875888}    HPI     Medication Followup of Zoloft    Taking Medication as prescribed: {.:261856::\"yes\"}    Side Effects:  {NONEORCHOOSE:447441::\"None\"}    Medication Helping Symptoms:  {.:274870::\"yes\"}     {additonal problems for provider to add (Optional):725076}    Review of Systems   {ROS COMP (Optional):741177}      Objective           Vitals:  No vitals were obtained today due to virtual visit.    Physical Exam   {GENERAL APPEARANCE:50::\"healthy\",\"alert\",\"no distress\"}  PSYCH: Alert and oriented times 3; coherent speech, normal   rate and volume, able to articulate logical thoughts, able   to abstract reason, no tangential thoughts, no hallucinations   or delusions  His affect is { :0703326::\"normal\"}  RESP: No cough, no audible wheezing, able to talk in full sentences  Remainder of exam unable to be completed due to telephone visits    {Diagnostic Test Results (Optional):073314}    {AMBULATORY ATTESTATION (Optional):160842}        Phone call duration: *** minutes  "

## 2022-03-15 NOTE — PROGRESS NOTES
Tremaine is a 31 year old who is being evaluated via a billable video visit.      How would you like to obtain your AVS? MyChart  If the video visit is dropped, the invitation should be resent by: Text to cell phone: [phone]  Will anyone else be joining your video visit? No      Video Start Time: 12:29 PM    Assessment & Plan       ICD-10-CM    1. Anxiety  F41.9 sertraline (ZOLOFT) 50 MG tablet     medical conditions are stable. meds refilled.   Recheck 6 months   con't counseling.     Return in about 6 months (around 9/15/2022) for recheck.    Rai Barragan PA-C  [facility]   Tremaine is a 31 year old who presents for the following health issues  accompanied by his self.    HPI     Medication Followup of Zoloft    Taking Medication as prescribed: yes    Side Effects:  None    Medication Helping Symptoms:  yes   Feels like his patience is much better and feels less worry.   Feels about 70% better.     Still in counseling and going well.     Still working on cutting back on ETOH.     Review of Systems   Constitutional, HEENT, cardiovascular, pulmonary, gi and gu systems are negative, except as otherwise noted.      Objective           Vitals:  No vitals were obtained today due to virtual visit.    Physical Exam   GENERAL: Healthy, alert and no distress  EYES: Eyes grossly normal to inspection.  No discharge or erythema, or obvious scleral/conjunctival abnormalities.  RESP: No audible wheeze, cough, or visible cyanosis.  No visible retractions or increased work of breathing.    SKIN: Visible skin clear. No significant rash, abnormal pigmentation or lesions.  NEURO: Cranial nerves grossly intact.  Mentation and speech appropriate for age.  PSYCH: Mentation appears normal, affect normal/bright, judgement and insight intact, normal speech and appearance well-groomed.        Video-Visit Details    Type of service:  Video Visit    Video End Time:12:36 PM    Originating Location  (pt. Location): Home    Distant Location (provider location):  [facility] [place]     Platform used for Video Visit: [employer]

## 2022-03-16 ASSESSMENT — ANXIETY QUESTIONNAIRES: GAD7 TOTAL SCORE: 6

## 2022-04-08 ENCOUNTER — OFFICE VISIT (OUTPATIENT)
Dept: FAMILY MEDICINE | Facility: CLINIC | Age: 32
End: 2022-04-08
Payer: COMMERCIAL

## 2022-04-08 VITALS
HEART RATE: 85 BPM | OXYGEN SATURATION: 97 % | DIASTOLIC BLOOD PRESSURE: 88 MMHG | WEIGHT: 232 LBS | HEIGHT: 71 IN | TEMPERATURE: 97.5 F | BODY MASS INDEX: 32.48 KG/M2 | SYSTOLIC BLOOD PRESSURE: 117 MMHG

## 2022-04-08 DIAGNOSIS — M54.41 ACUTE RIGHT-SIDED LOW BACK PAIN WITH RIGHT-SIDED SCIATICA: Primary | ICD-10-CM

## 2022-04-08 PROCEDURE — 99213 OFFICE O/P EST LOW 20 MIN: CPT | Performed by: NURSE PRACTITIONER

## 2022-04-08 RX ORDER — NAPROXEN 500 MG/1
500 TABLET ORAL 2 TIMES DAILY WITH MEALS
Qty: 20 TABLET | Refills: 0 | Status: SHIPPED | OUTPATIENT
Start: 2022-04-08 | End: 2022-04-18

## 2022-04-08 RX ORDER — CYCLOBENZAPRINE HCL 10 MG
10 TABLET ORAL 3 TIMES DAILY PRN
Qty: 30 TABLET | Refills: 0 | Status: SHIPPED | OUTPATIENT
Start: 2022-04-08

## 2022-04-08 RX ORDER — PREDNISONE 20 MG/1
40 TABLET ORAL DAILY
Qty: 10 TABLET | Refills: 0 | Status: SHIPPED | OUTPATIENT
Start: 2022-04-08 | End: 2022-04-13

## 2022-04-08 ASSESSMENT — PAIN SCALES - GENERAL: PAINLEVEL: EXTREME PAIN (8)

## 2022-04-08 ASSESSMENT — ENCOUNTER SYMPTOMS: BACK PAIN: 1

## 2022-04-08 NOTE — PROGRESS NOTES
Assessment & Plan     Acute right-sided low back pain with right-sided sciatica  Below meds and PT referral.  Discussed heat and ice, low back stretches.    Follow-up if not improving.    - Physical Therapy Referral; Future  - predniSONE (DELTASONE) 20 MG tablet; Take 2 tablets (40 mg) by mouth daily for 5 days  - cyclobenzaprine (FLEXERIL) 10 MG tablet; Take 1 tablet (10 mg) by mouth 3 times daily as needed for muscle spasms  - naproxen (NAPROSYN) 500 MG tablet; Take 1 tablet (500 mg) by mouth 2 times daily (with meals) for 10 days       Tobacco Cessation:   reports that he has never smoked. His smokeless tobacco use includes chew.      Return in about 4 weeks (around 5/6/2022) for If failure to improve, sooner if concerns.    Lissette Alcaraz, [facility]    Dafne Penaloza is a 31 year old who presents for the following health issues     Back Pain     History of Present Illness       Back Pain:  He presents for follow up of back pain. Patient's back pain is a recurring problem.  Location of back pain:  Right lower back, left lower back, right buttock, right hip, right side of waist and left side of waist  Description of back pain: sharp, shooting and stabbing  Back pain spreads: right buttocks and right knee    Since patient first noticed back pain, pain is: rapidly worsening  Does back pain interfere with his job:  No      He eats 2-3 servings of fruits and vegetables daily.He consumes 1 sweetened beverage(s) daily.He exercises with enough effort to increase his heart rate 20 to 29 minutes per day.  He exercises with enough effort to increase his heart rate 3 or less days per week.   He is taking medications regularly.       Lower back pain, started about 3 weeks ago.    Hx of some low back issues many years ago, baseball injury while in college.  L4-L5.   Had MRI back then with steroid injections and PT.  Had been doing pretty good until now.  Possibly aggravated after skiing.   "Denies any falls.      Pain is low back, worse on the right.  Gets pain down until the back of the knee, back of his leg.  Feels a little different than before, not as sharp.     No meds taken.  Trying stretching and trying to have good posture.     No saddle anesthesia, motor weakness or loss of bowel/bladder.        Review of Systems   Musculoskeletal: Positive for back pain.            Objective    /88   Pulse 85   Temp 97.5  F (36.4  C)   Ht 1.803 m (5' 11\")   Wt 105.2 kg (232 lb)   SpO2 97%   BMI 32.36 kg/m    Body mass index is 32.36 kg/m .  Physical Exam   GENERAL: healthy, alert and no distress  NECK: no adenopathy, no asymmetry, masses, or scars and thyroid normal to palpation  RESP: lungs clear to auscultation - no rales, rhonchi or wheezes  CV: regular rate and rhythm, normal S1 S2, no S3 or S4, no murmur, click or rub, no peripheral edema and peripheral pulses strong  ABDOMEN: soft, nontender, no hepatosplenomegaly, no masses and bowel sounds normal  MS: no gross musculoskeletal defects noted, no edema  SKIN: no suspicious lesions or rashes  NEURO: Normal strength and tone, mentation intact and speech normal          "

## 2022-04-08 NOTE — PATIENT INSTRUCTIONS
Referral to PT.   Continue to stay active and try low back stretches (can look up on YouTube).   Alternate heat and ice a few times per day.   Meds-  Naproxen 500 mg twice daily for 7 days.   Prednisone 40 mg daily for 5 days.   Can try flexeril 10 mg up to 3 times per day for muscle spasm/pain.

## 2022-05-11 ENCOUNTER — OFFICE VISIT (OUTPATIENT)
Dept: FAMILY MEDICINE | Facility: CLINIC | Age: 32
End: 2022-05-11
Payer: COMMERCIAL

## 2022-05-11 VITALS
SYSTOLIC BLOOD PRESSURE: 110 MMHG | TEMPERATURE: 97.8 F | HEIGHT: 71 IN | HEART RATE: 84 BPM | DIASTOLIC BLOOD PRESSURE: 80 MMHG | WEIGHT: 226 LBS | OXYGEN SATURATION: 97 % | RESPIRATION RATE: 18 BRPM | BODY MASS INDEX: 31.64 KG/M2

## 2022-05-11 DIAGNOSIS — S39.012A CHRONIC SACROILIAC STRAIN, INITIAL ENCOUNTER: Primary | ICD-10-CM

## 2022-05-11 PROCEDURE — 99213 OFFICE O/P EST LOW 20 MIN: CPT | Performed by: FAMILY MEDICINE

## 2022-05-11 ASSESSMENT — PATIENT HEALTH QUESTIONNAIRE - PHQ9
SUM OF ALL RESPONSES TO PHQ QUESTIONS 1-9: 4
5. POOR APPETITE OR OVEREATING: SEVERAL DAYS

## 2022-05-11 ASSESSMENT — ANXIETY QUESTIONNAIRES
IF YOU CHECKED OFF ANY PROBLEMS ON THIS QUESTIONNAIRE, HOW DIFFICULT HAVE THESE PROBLEMS MADE IT FOR YOU TO DO YOUR WORK, TAKE CARE OF THINGS AT HOME, OR GET ALONG WITH OTHER PEOPLE: SOMEWHAT DIFFICULT
3. WORRYING TOO MUCH ABOUT DIFFERENT THINGS: SEVERAL DAYS
6. BECOMING EASILY ANNOYED OR IRRITABLE: SEVERAL DAYS
2. NOT BEING ABLE TO STOP OR CONTROL WORRYING: NOT AT ALL
7. FEELING AFRAID AS IF SOMETHING AWFUL MIGHT HAPPEN: SEVERAL DAYS
5. BEING SO RESTLESS THAT IT IS HARD TO SIT STILL: NOT AT ALL
1. FEELING NERVOUS, ANXIOUS, OR ON EDGE: SEVERAL DAYS
GAD7 TOTAL SCORE: 5

## 2022-05-11 NOTE — PROGRESS NOTES
"SUBJECTIVE:    Tremaine Castro is a 31 year old male seen in clinic to day for low back pain.   Has been going on for 2 months.        History of Present Illness:    There is  no history of injury.but started after skiing   The pain is located on the right.   Pain Quality: Sharp   The back pain radiates into the right knee.   Measures which improve the pain include suddenly removed.   Measures which worsen the pain include skiing   Associated Signs and Symptoms: nopronlems   Numbness or Tingling No       There is no history of abdominal pain or bowel or bladder dysfunction.   Past Medical, social, family histories, medications, and allergies reviewed and updated    OBJECTIVE:  EXAM:   Blood pressure 110/80, pulse 84, temperature 97.8  F (36.6  C), temperature source Tympanic, resp. rate 18, height 1.803 m (5' 11\"), weight 102.5 kg (226 lb), SpO2 97 %.  General appearance - healthy, alert and no distress  Heart: no murmurs,Regular rate and rhythm.   Chest: is clear; no wheezes or rales.  Abd:The abdomen is soft without tenderness, guarding, mass, rebound or organomegaly. Bowel sounds are present  Back: Tenderness right SI joint  EXT: Lower extremities 5/5 strength bilaterally  NEURO: Neurovascularly Intact Distally.   DTRs normal and symmetric throughout L.E  Neg straight leg-raise rubens    ICD-10-CM    1. Chronic sacroiliac strain, initial encounter  S39.012A Physical Therapy Referral    PLAN:mobilize SI joint   Answers for HPI/ROS submitted by the patient on 5/11/2022  Your back pain is: recurring  Where is your back pain located? : right lower back, left lower back  How would you describe your back pain? : dull ache  Where does your back pain spread? : nowhere  Since you noticed your back pain, how has it changed? : gradually improving  Does your back pain interfere with your job?: No  How many servings of fruits and vegetables do you eat daily?: 2-3  On average, how many sweetened beverages do you drink each " day (Examples: soda, juice, sweet tea, etc.  Do NOT count diet or artificially sweetened beverages)?: 1  How many minutes a day do you exercise enough to make your heart beat faster?: 10 to 19  How many days a week do you exercise enough to make your heart beat faster?: 4  How many days per week do you miss taking your medication?: 0

## 2022-05-12 ASSESSMENT — ANXIETY QUESTIONNAIRES: GAD7 TOTAL SCORE: 5

## 2022-09-15 ENCOUNTER — VIRTUAL VISIT (OUTPATIENT)
Dept: FAMILY MEDICINE | Facility: CLINIC | Age: 32
End: 2022-09-15
Payer: COMMERCIAL

## 2022-09-15 DIAGNOSIS — F41.9 ANXIETY: ICD-10-CM

## 2022-09-15 PROCEDURE — 96127 BRIEF EMOTIONAL/BEHAV ASSMT: CPT | Mod: 59 | Performed by: PHYSICIAN ASSISTANT

## 2022-09-15 PROCEDURE — 99213 OFFICE O/P EST LOW 20 MIN: CPT | Mod: TEL | Performed by: PHYSICIAN ASSISTANT

## 2022-09-15 ASSESSMENT — ANXIETY QUESTIONNAIRES
3. WORRYING TOO MUCH ABOUT DIFFERENT THINGS: NOT AT ALL
GAD7 TOTAL SCORE: 6
6. BECOMING EASILY ANNOYED OR IRRITABLE: SEVERAL DAYS
IF YOU CHECKED OFF ANY PROBLEMS ON THIS QUESTIONNAIRE, HOW DIFFICULT HAVE THESE PROBLEMS MADE IT FOR YOU TO DO YOUR WORK, TAKE CARE OF THINGS AT HOME, OR GET ALONG WITH OTHER PEOPLE: SOMEWHAT DIFFICULT
7. FEELING AFRAID AS IF SOMETHING AWFUL MIGHT HAPPEN: NOT AT ALL
2. NOT BEING ABLE TO STOP OR CONTROL WORRYING: SEVERAL DAYS
1. FEELING NERVOUS, ANXIOUS, OR ON EDGE: SEVERAL DAYS
5. BEING SO RESTLESS THAT IT IS HARD TO SIT STILL: SEVERAL DAYS
GAD7 TOTAL SCORE: 6

## 2022-09-15 ASSESSMENT — PATIENT HEALTH QUESTIONNAIRE - PHQ9
5. POOR APPETITE OR OVEREATING: MORE THAN HALF THE DAYS
SUM OF ALL RESPONSES TO PHQ QUESTIONS 1-9: 5

## 2022-09-15 NOTE — PROGRESS NOTES
Tremaine is a 31 year old who is being evaluated via a billable telephone visit.       What phone number would you like to be contacted at? [phone]  How would you like to obtain your AVS? MyChart    Assessment & Plan       ICD-10-CM    1. Anxiety  F41.9 sertraline (ZOLOFT) 50 MG tablet     medical conditions are stable. meds refilled.  Recheck 6 months or sooner if needed.     Return in about 6 months (around 3/15/2023) for recheck.    Rai Barragan PA-C  [facility]   Tremaine is a 31 year old accompanied by his self, presenting for the following health issues:  Recheck Medication      HPI     Medication Followup of zoloft    Taking Medication as prescribed: yes    Side Effects:  None    Medication Helping Symptoms:  yes    Anxiety Follow-Up    How are you doing with your anxiety since your last visit? stable    Are you having other symptoms that might be associated with anxiety? No    Have you had a significant life event? No     Are you feeling depressed? No    Do you have any concerns with your use of alcohol or other drugs? No  About 70% better on medication.     Social History     Tobacco Use     Smoking status: Never Smoker     Smokeless tobacco: Current User     Types: Chew   Vaping Use     Vaping Use: Never used   Substance Use Topics     Alcohol use: Yes     Comment: social     Drug use: No     DENISE-7 SCORE 3/15/2022 5/11/2022 9/15/2022   Total Score 6 5 6     PHQ 3/15/2022 5/11/2022 9/15/2022   PHQ-9 Total Score 4 4 5   Q9: Thoughts of better off dead/self-harm past 2 weeks Not at all Not at all Not at all     Last PHQ-9 9/15/2022   1.  Little interest or pleasure in doing things 0   2.  Feeling down, depressed, or hopeless 1   3.  Trouble falling or staying asleep, or sleeping too much 2   4.  Feeling tired or having little energy 1   5.  Poor appetite or overeating 0   6.  Feeling bad about yourself 0   7.  Trouble concentrating 1   8.  Moving slowly or  restless 0   Q9: Thoughts of better off dead/self-harm past 2 weeks 0   PHQ-9 Total Score 5   Difficulty at work, home, or with people Somewhat difficult     DENISE-7  9/15/2022   1. Feeling nervous, anxious, or on edge 1   2. Not being able to stop or control worrying 1   3. Worrying too much about different things 0   4. Trouble relaxing 2   5. Being so restless that it is hard to sit still 1   6. Becoming easily annoyed or irritable 1   7. Feeling afraid, as if something awful might happen 0   DENISE-7 Total Score 6   If you checked any problems, how difficult have they made it for you to do your work, take care of things at home, or get along with other people? Somewhat difficult       Review of Systems   Constitutional, HEENT, cardiovascular, pulmonary, gi and gu systems are negative, except as otherwise noted.      Objective           Vitals:  No vitals were obtained today due to virtual visit.    Physical Exam   healthy, alert and no distress  PSYCH: Alert and oriented times 3; coherent speech, normal   rate and volume, able to articulate logical thoughts, able   to abstract reason, no tangential thoughts, no hallucinations   or delusions  His affect is normal  RESP: No cough, no audible wheezing, able to talk in full sentences  Remainder of exam unable to be completed due to telephone visits        Phone call duration: 5 minutes

## 2023-04-05 DIAGNOSIS — F41.9 ANXIETY: ICD-10-CM

## 2023-07-12 DIAGNOSIS — F41.9 ANXIETY: ICD-10-CM

## 2023-07-19 ENCOUNTER — VIRTUAL VISIT (OUTPATIENT)
Dept: FAMILY MEDICINE | Facility: CLINIC | Age: 33
End: 2023-07-19
Payer: COMMERCIAL

## 2023-07-19 DIAGNOSIS — F41.9 ANXIETY: ICD-10-CM

## 2023-07-19 PROCEDURE — 99213 OFFICE O/P EST LOW 20 MIN: CPT | Mod: 93 | Performed by: PHYSICIAN ASSISTANT

## 2023-07-19 NOTE — PROGRESS NOTES
Tremaine is a 32 year old who is being evaluated via a billable telephone visit.      What phone number would you like to be contacted at? [phone]  How would you like to obtain your AVS? Gennaroharshae    Distant Location (provider location):  Off-site    Assessment & Plan       ICD-10-CM    1. Anxiety  F41.9 sertraline (ZOLOFT) 50 MG tablet      medical conditions are stable. meds refilled.  warning signs discussed.      Rai Barragan PA-C  [facility]   Tremaine is a 32 year old, presenting for the following health issues:  Recheck Medication         No data to display              History of Present Illness       Reason for visit:  Med check    He eats 0-1 servings of fruits and vegetables daily.He consumes 0 sweetened beverage(s) daily.He exercises with enough effort to increase his heart rate 9 or less minutes per day.  He exercises with enough effort to increase his heart rate 3 or less days per week.   He is taking medications regularly.    Anxiety Follow-Up    How are you doing with your anxiety since your last visit? Stable and doing well    Are you having other symptoms that might be associated with anxiety? No    Have you had a significant life event? No     Are you feeling depressed? No    Do you have any concerns with your use of alcohol or other drugs? No    Overall feel like meds are helping about 70-80%.     Social History     Tobacco Use     Smoking status: Never     Smokeless tobacco: Current     Types: Chew   Vaping Use     Vaping Use: Never used   Substance Use Topics     Alcohol use: Yes     Comment: social     Drug use: No         3/15/2022    12:19 PM 5/11/2022     9:31 AM 9/15/2022     1:08 PM   DENISE-7 SCORE   Total Score 6 5 6         3/15/2022    12:19 PM 5/11/2022     9:31 AM 9/15/2022     1:06 PM   PHQ   PHQ-9 Total Score 4 4 5   Q9: Thoughts of better off dead/self-harm past 2 weeks Not at all Not at all Not at all     Review of Systems    Constitutional, HEENT, cardiovascular, pulmonary, gi and gu systems are negative, except as otherwise noted.      Objective           Vitals:  No vitals were obtained today due to virtual visit.    Physical Exam   healthy, alert and no distress  PSYCH: Alert and oriented times 3; coherent speech, normal   rate and volume, able to articulate logical thoughts, able   to abstract reason, no tangential thoughts, no hallucinations   or delusions  His affect is normal  RESP: No cough, no audible wheezing, able to talk in full sentences  Remainder of exam unable to be completed due to telephone visits          Phone call duration: 5 minutes

## 2023-09-15 ENCOUNTER — PATIENT OUTREACH (OUTPATIENT)
Dept: GASTROENTEROLOGY | Facility: CLINIC | Age: 33
End: 2023-09-15
Payer: COMMERCIAL

## 2023-09-15 DIAGNOSIS — Z12.11 SPECIAL SCREENING FOR MALIGNANT NEOPLASMS, COLON: Primary | ICD-10-CM

## 2023-09-15 NOTE — PROGRESS NOTES
"Patient has a family history of CRC and screening colonoscopy is due 12/28/2023. Patient meets criteria for CRC high risk standing order protocol.    Ordering/Referring Provider: Rai Barragan PA-C  BMI: Estimated body mass index is 31.52 kg/m  as calculated from the following:    Height as of 5/11/22: 1.803 m (5' 11\").    Weight as of 5/11/22: 102.5 kg (226 lb).     Sedation:  Based on patient's medical history patient is appropriate for   MAC/deep sedation.   BMI<= 45 45 < BMI <= 48 48 < BMI < = 50  BMI > 50   No Restrictions No [facility]  No [facility]  [facility] with exceptions Hospital Only OR Only       Location:  Does patient have an LVAD?  No    Does patient have a history of moderate to severe sleep apnea?  No    Does patient have a history of asthma, COPD or any other lung disease?  No    Does patient have a history of cardiac disease?  No    Is patient awaiting a heart or lung transplant?   No    Has patient had a stroke or transient ischemic attack in the last 6 months?   No    Is the patient currently on dialysis?   No    Prep:  Previous prep (last colonoscopy):   Unknown    Quality of previous prep:   Good    Is patient currently on dialysis, is ESRD, or CKD stage 4/5?   No (standard prep)    Does patient have a diagnosis of diabetes?  No    Does patient have a diagnosis of cystic fibrosis (CF)?  No    BMI > 40?  No    Final Referral Status:  meets the criteria for placement of colonoscopy screening  referral order.      Referral order placed with the following recommendations:  Sedation: MAC/Deep Sedation  Location Type: No Scheduling Restrictions  Prep: Standard [facility]  "

## 2023-12-21 ENCOUNTER — TELEPHONE (OUTPATIENT)
Dept: FAMILY MEDICINE | Facility: CLINIC | Age: 33
End: 2023-12-21
Payer: COMMERCIAL

## 2023-12-21 NOTE — TELEPHONE ENCOUNTER
Reason for Call:  Form, our goal is to have forms completed with 72 hours, however, some forms may require a visit or additional information.    Type of letter, form or note:   Life insurance    Who is the form from?: Patient    Where did the form come from: form was faxed in    What clinic location was the form placed at?: [place]    Where the form was placed: Given to physician    What number is listed as a contact on the form?: [phone] ext [phone]       Additional comments: Patient has appt 12/29    Call taken on 12/21/2023 at 10:53 AM by Fantasma Sherman

## 2023-12-29 NOTE — TELEPHONE ENCOUNTER
Needs to be seen.  Looks like he cancelled his apt today.   Will need to reschedule.      Rai Barragan PA-C

## 2024-08-03 DIAGNOSIS — F41.9 ANXIETY: ICD-10-CM

## 2024-08-05 NOTE — TELEPHONE ENCOUNTER
Reminder letter with provider's message mailed to patient's home address.  Marian MENON    [facility]

## 2024-09-24 ENCOUNTER — TELEPHONE (OUTPATIENT)
Dept: FAMILY MEDICINE | Facility: CLINIC | Age: 34
End: 2024-09-24
Payer: COMMERCIAL

## 2024-09-24 DIAGNOSIS — Z80.0 FAMILY HISTORY OF COLON CANCER: Primary | ICD-10-CM

## 2024-09-24 NOTE — TELEPHONE ENCOUNTER
Patient returned call to clinic and verified the reason for the colonoscopy order is due to his family history of colon cancer.  States he has had two colonoscopies in the past, and was instructed to repeat them every five years.  Marian MENON    [facility]

## 2024-09-24 NOTE — TELEPHONE ENCOUNTER
Order/Referral Request    Who is requesting: patient     Orders being requested: colonoscopy referral     Reason service is needed/diagnosis: preventative     When are orders needed by: ASAP    Has this been discussed with Provider: Yes    Does patient have a preference on a Group/Provider/Facility? [facility]    Does patient have an appointment scheduled?: No    Where to send orders: Place orders within Epic    Okay to leave a detailed message?: Yes at Cell number on file:    Telephone Information:   Mobile [phone]

## 2024-09-24 NOTE — TELEPHONE ENCOUNTER
I left a message to return a call to [phone]. I see he has a family hx of colon cancer.  Please ask why he is asking for a the colonoscopy.  Is it due to this reason or is he having issues. Please route to the provider after clarifying.  Mickie Nielson R.N.

## 2024-11-08 DIAGNOSIS — F41.9 ANXIETY: ICD-10-CM

## 2024-11-08 NOTE — LETTER
November 8, 2024    Tremaine Castro  [address] [address] [facility]  [place] [address]    Dear Tremaine,       We recently received a refill request for sertraline (ZOLOFT) 50 MG tablet.  We have refilled this for a one time 90 day supply only because you are due for a:    Medication Follow Up office visit      Please call at your earliest convenience so that there will not be a delay with your future refills.          Thank you,   Your [facility] Team/[place]  [phone]

## 2024-12-26 ENCOUNTER — OFFICE VISIT (OUTPATIENT)
Dept: FAMILY MEDICINE | Facility: CLINIC | Age: 34
End: 2024-12-26
Payer: COMMERCIAL

## 2024-12-26 VITALS
TEMPERATURE: 97.5 F | WEIGHT: 258 LBS | HEART RATE: 102 BPM | BODY MASS INDEX: 36.12 KG/M2 | RESPIRATION RATE: 18 BRPM | HEIGHT: 71 IN | DIASTOLIC BLOOD PRESSURE: 89 MMHG | SYSTOLIC BLOOD PRESSURE: 139 MMHG | OXYGEN SATURATION: 97 %

## 2024-12-26 DIAGNOSIS — F41.9 ANXIETY: Primary | ICD-10-CM

## 2024-12-26 RX ORDER — CITALOPRAM HYDROBROMIDE 10 MG/1
10 TABLET ORAL DAILY
Qty: 30 TABLET | Refills: 1 | Status: SHIPPED | OUTPATIENT
Start: 2024-12-26

## 2024-12-26 ASSESSMENT — ANXIETY QUESTIONNAIRES
1. FEELING NERVOUS, ANXIOUS, OR ON EDGE: MORE THAN HALF THE DAYS
2. NOT BEING ABLE TO STOP OR CONTROL WORRYING: MORE THAN HALF THE DAYS
8. IF YOU CHECKED OFF ANY PROBLEMS, HOW DIFFICULT HAVE THESE MADE IT FOR YOU TO DO YOUR WORK, TAKE CARE OF THINGS AT HOME, OR GET ALONG WITH OTHER PEOPLE?: VERY DIFFICULT
6. BECOMING EASILY ANNOYED OR IRRITABLE: MORE THAN HALF THE DAYS
7. FEELING AFRAID AS IF SOMETHING AWFUL MIGHT HAPPEN: SEVERAL DAYS
5. BEING SO RESTLESS THAT IT IS HARD TO SIT STILL: SEVERAL DAYS
GAD7 TOTAL SCORE: 13
IF YOU CHECKED OFF ANY PROBLEMS ON THIS QUESTIONNAIRE, HOW DIFFICULT HAVE THESE PROBLEMS MADE IT FOR YOU TO DO YOUR WORK, TAKE CARE OF THINGS AT HOME, OR GET ALONG WITH OTHER PEOPLE: VERY DIFFICULT
4. TROUBLE RELAXING: NEARLY EVERY DAY
GAD7 TOTAL SCORE: 13
3. WORRYING TOO MUCH ABOUT DIFFERENT THINGS: MORE THAN HALF THE DAYS
7. FEELING AFRAID AS IF SOMETHING AWFUL MIGHT HAPPEN: SEVERAL DAYS
GAD7 TOTAL SCORE: 13

## 2024-12-26 ASSESSMENT — PAIN SCALES - GENERAL: PAINLEVEL_OUTOF10: NO PAIN (0)

## 2024-12-26 ASSESSMENT — PATIENT HEALTH QUESTIONNAIRE - PHQ9
SUM OF ALL RESPONSES TO PHQ QUESTIONS 1-9: 14
SUM OF ALL RESPONSES TO PHQ QUESTIONS 1-9: 14
10. IF YOU CHECKED OFF ANY PROBLEMS, HOW DIFFICULT HAVE THESE PROBLEMS MADE IT FOR YOU TO DO YOUR WORK, TAKE CARE OF THINGS AT HOME, OR GET ALONG WITH OTHER PEOPLE: VERY DIFFICULT

## 2024-12-26 NOTE — PATIENT INSTRUCTIONS
Take zoloft 1/2 tablet (25mg) daily for 1 weeks, then every other day for 1 week. Then stop it. At that time then start citalopram daily after stopping zoloft.

## 2024-12-26 NOTE — PROGRESS NOTES
Assessment & Plan       ICD-10-CM    1. Anxiety  F41.9 citalopram (CELEXA) 10 MG tablet      Talk to patient about his concerns he is interested in switching to different medication.  Will taper him off Zoloft and add citalopram.  Warning signs side effects were discussed.  Will recheck in about 6 weeks.  We did discuss that he could have similar side effects with this medication also.      The longitudinal plan of care for the diagnosis(es)/condition(s) as documented were addressed during this visit. Due to the added complexity in care, I will continue to support Tremaine in the subsequent management and with ongoing continuity of care.  Depression Screening Follow Up        12/26/2024    11:39 AM   PHQ   PHQ-9 Total Score 14    Q9: Thoughts of better off dead/self-harm past 2 weeks Not at all       Patient-reported       Subjective   Tremaine is a 34 year old, presenting for the following health issues:  Recheck Medication    History of Present Illness       Mental Health Follow-up:  Patient presents to follow-up on Depression & Anxiety.Patient's depression since last visit has been:  Worse  The patient is having other symptoms associated with depression.  Patient's anxiety since last visit has been:  Worse  The patient is having other symptoms associated with anxiety.  Any significant life events: relationship concerns, job concerns, financial concerns and grief or loss  Patient is feeling anxious or having panic attacks.  Patient has concerns about alcohol or drug use.    He eats 2-3 servings of fruits and vegetables daily.He consumes 2 sweetened beverage(s) daily.He exercises with enough effort to increase his heart rate 9 or less minutes per day.  He exercises with enough effort to increase his heart rate 3 or less days per week. He is missing 1 dose(s) of medications per week.  He is not taking prescribed medications regularly due to remembering to take.   Recent passing of his mother from lung cancer. 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   Sertraline causing flat affect and no emotions. Decrease libido.       Review of Systems  Constitutional, HEENT, cardiovascular, pulmonary, gi and gu systems are negative, except as otherwise noted.      Objective    /89   Pulse 102   Temp 97.5  F (36.4  C) (Tympanic)   Resp 18   Ht 1.803 m (5' 11\")   Wt 117 kg (258 lb)   SpO2 97%   BMI 35.98 kg/m    Body mass index is 35.98 kg/m .  Physical Exam   GENERAL: alert and no distress  PSYCH: mentation appears normal, affect normal/bright            Signed Electronically by: Rai Barragan PA-C      "

## 2025-02-05 ENCOUNTER — OFFICE VISIT (OUTPATIENT)
Dept: FAMILY MEDICINE | Facility: CLINIC | Age: 35
End: 2025-02-05
Payer: COMMERCIAL

## 2025-02-05 VITALS
TEMPERATURE: 97.1 F | BODY MASS INDEX: 37.1 KG/M2 | WEIGHT: 265 LBS | DIASTOLIC BLOOD PRESSURE: 82 MMHG | HEART RATE: 95 BPM | HEIGHT: 71 IN | OXYGEN SATURATION: 97 % | SYSTOLIC BLOOD PRESSURE: 133 MMHG | RESPIRATION RATE: 18 BRPM

## 2025-02-05 DIAGNOSIS — F41.9 ANXIETY: ICD-10-CM

## 2025-02-05 PROCEDURE — 99213 OFFICE O/P EST LOW 20 MIN: CPT | Performed by: PHYSICIAN ASSISTANT

## 2025-02-05 PROCEDURE — G2211 COMPLEX E/M VISIT ADD ON: HCPCS | Performed by: PHYSICIAN ASSISTANT

## 2025-02-05 RX ORDER — CITALOPRAM HYDROBROMIDE 10 MG/1
10 TABLET ORAL DAILY
Qty: 90 TABLET | Refills: 0 | Status: SHIPPED | OUTPATIENT
Start: 2025-02-05

## 2025-02-05 ASSESSMENT — ANXIETY QUESTIONNAIRES
6. BECOMING EASILY ANNOYED OR IRRITABLE: MORE THAN HALF THE DAYS
3. WORRYING TOO MUCH ABOUT DIFFERENT THINGS: SEVERAL DAYS
IF YOU CHECKED OFF ANY PROBLEMS ON THIS QUESTIONNAIRE, HOW DIFFICULT HAVE THESE PROBLEMS MADE IT FOR YOU TO DO YOUR WORK, TAKE CARE OF THINGS AT HOME, OR GET ALONG WITH OTHER PEOPLE: VERY DIFFICULT
GAD7 TOTAL SCORE: 11
7. FEELING AFRAID AS IF SOMETHING AWFUL MIGHT HAPPEN: MORE THAN HALF THE DAYS
8. IF YOU CHECKED OFF ANY PROBLEMS, HOW DIFFICULT HAVE THESE MADE IT FOR YOU TO DO YOUR WORK, TAKE CARE OF THINGS AT HOME, OR GET ALONG WITH OTHER PEOPLE?: VERY DIFFICULT
GAD7 TOTAL SCORE: 11
7. FEELING AFRAID AS IF SOMETHING AWFUL MIGHT HAPPEN: MORE THAN HALF THE DAYS
4. TROUBLE RELAXING: MORE THAN HALF THE DAYS
GAD7 TOTAL SCORE: 11
5. BEING SO RESTLESS THAT IT IS HARD TO SIT STILL: MORE THAN HALF THE DAYS
1. FEELING NERVOUS, ANXIOUS, OR ON EDGE: SEVERAL DAYS
2. NOT BEING ABLE TO STOP OR CONTROL WORRYING: SEVERAL DAYS

## 2025-02-05 ASSESSMENT — PAIN SCALES - GENERAL: PAINLEVEL_OUTOF10: NO PAIN (0)

## 2025-05-22 DIAGNOSIS — F41.9 ANXIETY: ICD-10-CM

## 2025-05-22 RX ORDER — CITALOPRAM HYDROBROMIDE 10 MG/1
10 TABLET ORAL DAILY
Qty: 90 TABLET | Refills: 0 | Status: SHIPPED | OUTPATIENT
Start: 2025-05-22

## 2025-08-29 DIAGNOSIS — F41.9 ANXIETY: ICD-10-CM

## 2025-09-02 RX ORDER — CITALOPRAM HYDROBROMIDE 10 MG/1
10 TABLET ORAL DAILY
Qty: 90 TABLET | Refills: 0 | Status: SHIPPED | OUTPATIENT
Start: 2025-09-02

## (undated) DEVICE — SOL WATER IRRIG 1000ML BOTTLE 07139-09

## (undated) RX ORDER — SIMETHICONE 40MG/0.6ML
SUSPENSION, DROPS(FINAL DOSAGE FORM)(ML) ORAL
Status: DISPENSED
Start: 2018-12-28

## (undated) RX ORDER — FENTANYL CITRATE 50 UG/ML
INJECTION, SOLUTION INTRAMUSCULAR; INTRAVENOUS
Status: DISPENSED
Start: 2018-12-28